# Patient Record
Sex: MALE | Race: WHITE | NOT HISPANIC OR LATINO | Employment: STUDENT | URBAN - METROPOLITAN AREA
[De-identification: names, ages, dates, MRNs, and addresses within clinical notes are randomized per-mention and may not be internally consistent; named-entity substitution may affect disease eponyms.]

---
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
## 2024-06-13 ENCOUNTER — OFFICE VISIT (OUTPATIENT)
Dept: OPTOMETRY | Facility: CLINIC | Age: 19
End: 2024-06-13
Payer: COMMERCIAL

## 2024-06-13 DIAGNOSIS — H52.13 MYOPIA, BILATERAL: Primary | ICD-10-CM

## 2024-06-13 PROCEDURE — 92015 DETERMINE REFRACTIVE STATE: CPT | Mod: S$GLB,,, | Performed by: OPTOMETRIST

## 2024-06-13 PROCEDURE — 92014 COMPRE OPH EXAM EST PT 1/>: CPT | Mod: S$GLB,,, | Performed by: OPTOMETRIST

## 2024-06-13 PROCEDURE — 1159F MED LIST DOCD IN RCRD: CPT | Mod: CPTII,S$GLB,, | Performed by: OPTOMETRIST

## 2024-06-13 PROCEDURE — 99999 PR PBB SHADOW E&M-EST. PATIENT-LVL III: CPT | Mod: PBBFAC,,, | Performed by: OPTOMETRIST

## 2024-06-13 NOTE — PROGRESS NOTES
HPI    Room 2  Annual Exam   Pt states vision is stable  Never wore Contact lenses consistently and declines fit     Pt denies F/F   Pt denies Dry/ Itchy/ Burning  Gtt: No    Last edited by Ranjeet Zazueta, OD on 6/13/2024  1:37 PM.            Assessment /Plan     For exam results, see Encounter Report.    Myopia, bilateral  Eyeglass Final Rx       Eyeglass Final Rx         Sphere Cylinder Dist VA    Right -2.25 Sphere 20/20    Left -2.00 Sphere 20/20      Type: SVL    Expiration Date: 6/13/2025                      RTC 1 yr